# Patient Record
Sex: MALE | URBAN - METROPOLITAN AREA
[De-identification: names, ages, dates, MRNs, and addresses within clinical notes are randomized per-mention and may not be internally consistent; named-entity substitution may affect disease eponyms.]

---

## 2018-09-13 ENCOUNTER — APPOINTMENT (RX ONLY)
Dept: URBAN - METROPOLITAN AREA CLINIC 322 | Facility: CLINIC | Age: 53
Setting detail: DERMATOLOGY
End: 2018-09-13

## 2018-09-13 DIAGNOSIS — Q82.5 CONGENITAL NON-NEOPLASTIC NEVUS: ICD-10-CM

## 2018-09-13 PROCEDURE — ? DIAGNOSIS COMMENT

## 2018-09-13 PROCEDURE — ? PULSED-DYE LASER TREATMENT

## 2018-09-13 PROCEDURE — ? COUNSELING

## 2018-09-13 PROCEDURE — ? PATIENT SPECIFIC COUNSELING

## 2018-09-13 ASSESSMENT — LOCATION ZONE DERM: LOCATION ZONE: FACE

## 2018-09-13 ASSESSMENT — LOCATION SIMPLE DESCRIPTION DERM: LOCATION SIMPLE: RIGHT CHEEK

## 2018-09-13 ASSESSMENT — LOCATION DETAILED DESCRIPTION DERM: LOCATION DETAILED: RIGHT INFERIOR CENTRAL MALAR CHEEK

## 2018-09-13 NOTE — PROCEDURE: DIAGNOSIS COMMENT
Comment: Patient was brought into the laser suite. The treatment was discussed. The risks benefits and alternative treatments discussed. Informed consent discussed and signed. Protective eye wear was worn by everyone in the room. The treatment area was cleaned and prepped. The laser was calibrated and a laser safety check was done. The parameters were spoken out load. The area was treated.
Detail Level: Simple

## 2018-09-13 NOTE — PROCEDURE: PULSED-DYE LASER TREATMENT
Detail Level: Zone
Fluence: 12.5
Immediate Endpoint (Optional): purpura
Cryogen Time (Ms): 30
Post-Care Instructions: I reviewed with the patient in detail post-care instructions. Patient should stay away from the sun and wear sun protection until treated areas are fully healed.
Pre-Procedure: Prior to the procedure all persons present put on protective eye-wear.
Consent: Written consent obtained, risks reviewed including but not limited to crusting, scabbing, blistering, scarring, darker or lighter pigmentary change, incidental hair removal, bruising, and/or incomplete improvement.
Spot Size: 7 mm
Post-Procedure: Following the procedure vaseline and ice was applied to the treatment areas and post care was reviewed with the patient.
Laser Type: Vbeam 595nm
Pulse Duration: 1.5 ms

## 2018-11-01 ENCOUNTER — APPOINTMENT (RX ONLY)
Dept: URBAN - METROPOLITAN AREA CLINIC 322 | Facility: CLINIC | Age: 53
Setting detail: DERMATOLOGY
End: 2018-11-01

## 2018-11-01 DIAGNOSIS — Q82.5 CONGENITAL NON-NEOPLASTIC NEVUS: ICD-10-CM

## 2018-11-01 PROBLEM — Q825 VASCULAR HAMARTOMAS: Status: ACTIVE | Noted: 2018-11-01

## 2018-11-01 PROCEDURE — ? PULSED-DYE LASER TREATMENT

## 2018-11-01 PROCEDURE — ? DIAGNOSIS COMMENT

## 2018-11-01 PROCEDURE — ? PATIENT SPECIFIC COUNSELING

## 2018-11-01 PROCEDURE — ? COUNSELING

## 2018-11-01 ASSESSMENT — LOCATION DETAILED DESCRIPTION DERM: LOCATION DETAILED: RIGHT INFERIOR CENTRAL MALAR CHEEK

## 2018-11-01 ASSESSMENT — LOCATION ZONE DERM: LOCATION ZONE: FACE

## 2018-11-01 ASSESSMENT — LOCATION SIMPLE DESCRIPTION DERM: LOCATION SIMPLE: RIGHT CHEEK

## 2018-11-01 NOTE — PROCEDURE: PULSED-DYE LASER TREATMENT
Consent: Written consent obtained, risks reviewed including but not limited to crusting, scabbing, blistering, scarring, darker or lighter pigmentary change, incidental hair removal, bruising, and/or incomplete improvement.
Pulse Duration: 1.5 ms
Spot Size: 7 mm
Pre-Procedure: Prior to the procedure all persons present put on protective eye-wear.
Post-Procedure: Following the procedure vaseline and ice was applied to the treatment areas and post care was reviewed with the patient.
Cryogen Time (Ms): 30
Immediate Endpoint (Optional): purpura
Post-Care Instructions: I reviewed with the patient in detail post-care instructions. Patient should stay away from the sun and wear sun protection until treated areas are fully healed.
Laser Type: Vbeam 595nm
Pulse Count (Optional): and 10.5 below eye  (one strip)
Detail Level: Zone
Fluence: 12.5